# Patient Record
Sex: MALE | Race: OTHER | Employment: FULL TIME | URBAN - METROPOLITAN AREA
[De-identification: names, ages, dates, MRNs, and addresses within clinical notes are randomized per-mention and may not be internally consistent; named-entity substitution may affect disease eponyms.]

---

## 2018-01-28 ENCOUNTER — TELEPHONE (OUTPATIENT)
Dept: INTERNAL MEDICINE CLINIC | Facility: CLINIC | Age: 53
End: 2018-01-28

## 2018-01-28 NOTE — TELEPHONE ENCOUNTER
Call patient. Last OV in our practice was in 2015. Is he still our patient? Please verify YES or NO. If yes, then schedule male CPX. If no, then submit to [facility] for healthy planet removal.    Parviz Brown.  Kyle Madrid 74, [address] [facility] Board of Internal

## 2018-04-26 ENCOUNTER — HOSPITAL (OUTPATIENT)
Dept: OTHER | Age: 53
End: 2018-04-26
Attending: SPECIALIST

## 2018-06-01 ENCOUNTER — HOSPITAL (OUTPATIENT)
Dept: OTHER | Age: 53
End: 2018-06-01
Attending: SPECIALIST

## 2018-11-15 ENCOUNTER — HOSPITAL ENCOUNTER (OUTPATIENT)
Age: 53
Discharge: HOME | End: 2018-11-15
Attending: FAMILY MEDICINE
Payer: COMMERCIAL

## 2018-11-15 VITALS
TEMPERATURE: 98 F | DIASTOLIC BLOOD PRESSURE: 80 MMHG | SYSTOLIC BLOOD PRESSURE: 128 MMHG | HEART RATE: 76 BPM | OXYGEN SATURATION: 99 % | HEIGHT: 65 IN | WEIGHT: 215 LBS | RESPIRATION RATE: 18 BRPM | BODY MASS INDEX: 35.82 KG/M2

## 2018-11-15 DIAGNOSIS — K52.9 ACUTE GASTROENTERITIS: Primary | ICD-10-CM

## 2018-11-15 PROCEDURE — 85025 COMPLETE CBC W/AUTO DIFF WBC: CPT | Performed by: FAMILY MEDICINE

## 2018-11-15 PROCEDURE — 99214 OFFICE O/P EST MOD 30 MIN: CPT

## 2018-11-15 PROCEDURE — 99204 OFFICE O/P NEW MOD 45 MIN: CPT

## 2018-11-15 PROCEDURE — 96360 HYDRATION IV INFUSION INIT: CPT

## 2018-11-15 PROCEDURE — 80047 BASIC METABLC PNL IONIZED CA: CPT

## 2018-11-15 PROCEDURE — 81002 URINALYSIS NONAUTO W/O SCOPE: CPT | Performed by: FAMILY MEDICINE

## 2018-11-15 RX ORDER — ONDANSETRON 4 MG/1
4 TABLET, ORALLY DISINTEGRATING ORAL EVERY 4 HOURS PRN
Qty: 10 TABLET | Refills: 0 | Status: SHIPPED | OUTPATIENT
Start: 2018-11-15 | End: 2018-11-22

## 2018-11-15 RX ORDER — SODIUM CHLORIDE 9 MG/ML
1000 INJECTION, SOLUTION INTRAVENOUS ONCE
Status: COMPLETED | OUTPATIENT
Start: 2018-11-15 | End: 2018-11-15

## 2018-11-15 RX ORDER — LOPERAMIDE HYDROCHLORIDE 2 MG/1
2 TABLET ORAL AS NEEDED
Qty: 20 TABLET | Refills: 0 | Status: SHIPPED | OUTPATIENT
Start: 2018-11-15 | End: 2018-11-15

## 2018-11-15 RX ORDER — LOPERAMIDE HYDROCHLORIDE 2 MG/1
2 TABLET ORAL AS NEEDED
Qty: 20 TABLET | Refills: 0 | Status: SHIPPED | OUTPATIENT
Start: 2018-11-15 | End: 2018-12-15

## 2018-11-15 RX ORDER — ONDANSETRON 4 MG/1
4 TABLET, ORALLY DISINTEGRATING ORAL ONCE
Status: COMPLETED | OUTPATIENT
Start: 2018-11-15 | End: 2018-11-15

## 2018-11-15 RX ORDER — ONDANSETRON 4 MG/1
4 TABLET, ORALLY DISINTEGRATING ORAL EVERY 4 HOURS PRN
Qty: 10 TABLET | Refills: 0 | Status: SHIPPED | OUTPATIENT
Start: 2018-11-15 | End: 2018-11-15

## 2018-11-16 NOTE — ED NOTES
Message left to [address] [place] [address]  to cancel the prescriptions Loperamide and Zofran. Pharmacy changed to [facility] [address] in [place].

## 2018-11-16 NOTE — ED PROVIDER NOTES
Patient Seen in: [facility]    History   Patient presents with:  Nausea/Vomiting/Diarrhea (gastrointestinal)  Abdominal Pain    Stated Complaint: ABD PAIN     HPI    62-year-old male with no stated past medical history has had 2 97.5 kg   SpO2 95%   BMI 35.78 kg/m²         Physical Exam    Gen: Pleasant male in mild distress. HEENT: NCAT. EOMI, PERRLA, Non-icteric. Dry mucous membranes. Neck: Supple.  FROM  Cor: S1S2, no murmur, RRR  Lungs: CTA B, no w/r/r  Abd: Decreased kyrie [address]  [phone]    Call   As needed        Medications Prescribed:  Current Discharge Medication List    START taking these medications    ondansetron 4 MG Oral Tablet Dispersible  Take 1 tablet (4 mg total) by mouth every 4 (four) hours as needed for

## 2018-11-16 NOTE — ED INITIAL ASSESSMENT (HPI)
Patient presents with cc of nausea,vomiting(emesis x 2 )diarrhea(x2 yesterday and today) x 2 days. +Chills. Wife was sick w/similar symptoms(recent trip to [facility])

## 2020-01-27 ENCOUNTER — WALK IN (OUTPATIENT)
Dept: URGENT CARE | Age: 55
End: 2020-01-27

## 2020-01-27 DIAGNOSIS — R68.89 FLU-LIKE SYMPTOMS: Primary | ICD-10-CM

## 2020-01-27 DIAGNOSIS — H66.002 NON-RECURRENT ACUTE SUPPURATIVE OTITIS MEDIA OF LEFT EAR WITHOUT SPONTANEOUS RUPTURE OF TYMPANIC MEMBRANE: ICD-10-CM

## 2020-01-27 DIAGNOSIS — H65.01 NON-RECURRENT ACUTE SEROUS OTITIS MEDIA OF RIGHT EAR: ICD-10-CM

## 2020-01-27 PROCEDURE — 99203 OFFICE O/P NEW LOW 30 MIN: CPT | Performed by: NURSE PRACTITIONER

## 2020-01-27 RX ORDER — FEXOFENADINE HCL 180 MG/1
TABLET ORAL
COMMUNITY

## 2020-01-27 RX ORDER — OMEPRAZOLE 40 MG/1
40 CAPSULE, DELAYED RELEASE ORAL
COMMUNITY
Start: 2018-05-01

## 2020-01-27 RX ORDER — AMOXICILLIN 875 MG/1
875 TABLET, COATED ORAL 2 TIMES DAILY
Qty: 20 TABLET | Refills: 0 | Status: SHIPPED | OUTPATIENT
Start: 2020-01-27 | End: 2020-02-06

## 2020-01-27 SDOH — HEALTH STABILITY: MENTAL HEALTH: HOW OFTEN DO YOU HAVE A DRINK CONTAINING ALCOHOL?: NEVER

## 2020-01-27 ASSESSMENT — PAIN SCALES - GENERAL: PAINLEVEL: 5-6

## 2020-01-27 ASSESSMENT — ENCOUNTER SYMPTOMS
CHILLS: 1
HEADACHES: 1
SHORTNESS OF BREATH: 0
COUGH: 1
TINGLING: 0
SORE THROAT: 1
SPUTUM PRODUCTION: 0
DIZZINESS: 0
GASTROINTESTINAL NEGATIVE: 1
WHEEZING: 0
FEVER: 1
HEMOPTYSIS: 0

## 2020-12-29 ENCOUNTER — HOSPITAL ENCOUNTER (OUTPATIENT)
Facility: HOSPITAL | Age: 55
Setting detail: OBSERVATION
Discharge: HOME | End: 2020-12-30
Attending: EMERGENCY MEDICINE | Admitting: HOSPITALIST
Payer: COMMERCIAL

## 2020-12-29 ENCOUNTER — HOSPITAL ENCOUNTER (OUTPATIENT)
Age: 55
Discharge: ANOTHER HOSPITAL | End: 2020-12-29
Attending: EMERGENCY MEDICINE
Payer: COMMERCIAL

## 2020-12-29 ENCOUNTER — APPOINTMENT (OUTPATIENT)
Dept: CT IMAGING | Age: 55
End: 2020-12-29
Attending: EMERGENCY MEDICINE
Payer: COMMERCIAL

## 2020-12-29 VITALS
DIASTOLIC BLOOD PRESSURE: 79 MMHG | RESPIRATION RATE: 16 BRPM | TEMPERATURE: 97 F | SYSTOLIC BLOOD PRESSURE: 130 MMHG | OXYGEN SATURATION: 98 % | HEART RATE: 61 BPM

## 2020-12-29 DIAGNOSIS — K22.89 ESOPHAGEAL MASS: Primary | ICD-10-CM

## 2020-12-29 LAB — SARS-COV-2 RNA RESP QL NAA+PROBE: NOT DETECTED

## 2020-12-29 PROCEDURE — 96361 HYDRATE IV INFUSION ADD-ON: CPT

## 2020-12-29 PROCEDURE — 74176 CT ABD & PELVIS W/O CONTRAST: CPT | Performed by: EMERGENCY MEDICINE

## 2020-12-29 PROCEDURE — 96375 TX/PRO/DX INJ NEW DRUG ADDON: CPT

## 2020-12-29 PROCEDURE — 96374 THER/PROPH/DIAG INJ IV PUSH: CPT

## 2020-12-29 PROCEDURE — 99220 INITIAL OBSERVATION CARE,LEVL III: CPT | Performed by: HOSPITALIST

## 2020-12-29 PROCEDURE — 85025 COMPLETE CBC W/AUTO DIFF WBC: CPT | Performed by: EMERGENCY MEDICINE

## 2020-12-29 PROCEDURE — 81002 URINALYSIS NONAUTO W/O SCOPE: CPT | Performed by: EMERGENCY MEDICINE

## 2020-12-29 PROCEDURE — 80047 BASIC METABLC PNL IONIZED CA: CPT

## 2020-12-29 PROCEDURE — 99215 OFFICE O/P EST HI 40 MIN: CPT

## 2020-12-29 RX ORDER — ENOXAPARIN SODIUM 100 MG/ML
40 INJECTION SUBCUTANEOUS DAILY
Status: DISCONTINUED | OUTPATIENT
Start: 2020-12-29 | End: 2020-12-30

## 2020-12-29 RX ORDER — KETOROLAC TROMETHAMINE 30 MG/ML
15 INJECTION, SOLUTION INTRAMUSCULAR; INTRAVENOUS ONCE
Status: COMPLETED | OUTPATIENT
Start: 2020-12-29 | End: 2020-12-29

## 2020-12-29 RX ORDER — ONDANSETRON 2 MG/ML
4 INJECTION INTRAMUSCULAR; INTRAVENOUS ONCE
Status: COMPLETED | OUTPATIENT
Start: 2020-12-29 | End: 2020-12-29

## 2020-12-29 RX ORDER — SODIUM CHLORIDE 9 MG/ML
1000 INJECTION, SOLUTION INTRAVENOUS ONCE
Status: COMPLETED | OUTPATIENT
Start: 2020-12-29 | End: 2020-12-29

## 2020-12-29 RX ORDER — OMEPRAZOLE 40 MG/1
40 CAPSULE, DELAYED RELEASE ORAL DAILY
COMMUNITY

## 2020-12-29 RX ORDER — SODIUM CHLORIDE 9 MG/ML
INJECTION, SOLUTION INTRAVENOUS CONTINUOUS
Status: ACTIVE | OUTPATIENT
Start: 2020-12-29 | End: 2020-12-29

## 2020-12-29 RX ORDER — PROCHLORPERAZINE EDISYLATE 5 MG/ML
5 INJECTION INTRAMUSCULAR; INTRAVENOUS EVERY 8 HOURS PRN
Status: DISCONTINUED | OUTPATIENT
Start: 2020-12-29 | End: 2020-12-30

## 2020-12-29 RX ORDER — ACETAMINOPHEN 325 MG/1
650 TABLET ORAL EVERY 6 HOURS PRN
Status: DISCONTINUED | OUTPATIENT
Start: 2020-12-29 | End: 2020-12-30

## 2020-12-29 RX ORDER — CETIRIZINE HYDROCHLORIDE 10 MG/1
10 TABLET ORAL DAILY
Status: DISCONTINUED | OUTPATIENT
Start: 2020-12-30 | End: 2020-12-30

## 2020-12-29 RX ORDER — ONDANSETRON 2 MG/ML
4 INJECTION INTRAMUSCULAR; INTRAVENOUS EVERY 6 HOURS PRN
Status: DISCONTINUED | OUTPATIENT
Start: 2020-12-29 | End: 2020-12-30

## 2020-12-29 RX ORDER — OMEPRAZOLE 20 MG/1
40 CAPSULE, DELAYED RELEASE ORAL
COMMUNITY
End: 2020-12-29

## 2020-12-29 RX ORDER — LORATADINE 10 MG/1
10 TABLET ORAL DAILY
COMMUNITY

## 2020-12-29 RX ORDER — SODIUM CHLORIDE, SODIUM LACTATE, POTASSIUM CHLORIDE, CALCIUM CHLORIDE 600; 310; 30; 20 MG/100ML; MG/100ML; MG/100ML; MG/100ML
INJECTION, SOLUTION INTRAVENOUS CONTINUOUS
Status: DISCONTINUED | OUTPATIENT
Start: 2020-12-29 | End: 2020-12-30

## 2020-12-29 NOTE — ED INITIAL ASSESSMENT (HPI)
Patient presents to IC with cc of right lower abdominal pain which radiates periumbilically. +Nausea yesterday without emesis. No diarrhea. Had eaten 3 day old shrimp Sunday night. No fever noted. Pain is with palpation.

## 2020-12-29 NOTE — ED PROVIDER NOTES
Patient Seen in: [facility] Emergency Department      History   Patient presents with:   Other    Stated Complaint: esophageal mass    HPI    65-year-old male sent from immediate care after CT scan showed evidence of an esophageal mass concerning for Vitals as listed. No acute distress   HEENT: Sclerae anicteric. Conjunctivae show no pallor.   Oropharynx clear, mucous membranes moist   Neck: supple, no rigidity   Lungs: good air exchange and clear   Heart: regular rate rhythm and no murmur   Abdomen: prominent in size measuring up to 8 mm at the base. There is no inflammatory change surrounding the appendix which makes appendicitis much less likely.   There is a moderate amount of stool throughout the colon including the cecum which  may explain the [facility] Prescribed:  Current Discharge Medication List                          Present on Admission  Date Reviewed: 12/29/2020          ICD-10-CM Noted POA    Esophageal mass K22.8 12/29/2020 Unknown

## 2020-12-29 NOTE — ED PROVIDER NOTES
Patient Seen in: [facility]      History   Patient presents with:  Abdominal Pain    Stated Complaint: had shrimp,stomache pain    HPI    50-year-old gentleman coming for evaluation for some right-sided abdominal pain started Sunday after Pulmonary:      Effort: Pulmonary effort is normal. No respiratory distress. Abdominal:      General: There is no distension. Palpations: Abdomen is soft. Tenderness: There is abdominal tenderness in the right lower quadrant.  There is no guar an exophytic masslike lesion to the left of     midline involving the distal esophagus measuring 48 x 35 mm.   This does     appear to be a submucosal mass like a leiomyoma however other neoplasm     such as just tumor or adenocarcinoma of     the esophagus IV established. Laboratory work-up was reviewed. Patient had a CT scan of the abdomen pelvis demonstrating a prominent size appendix but no periappendiceal change.  I discussed case and CT with Dr. Lawyer Ellis who does not feel patient stands consistent wi

## 2020-12-29 NOTE — ED NOTES
Pt given menu now. Per ED MD, ok to eat. NPO after midnight.  Pt states understanding of NPO after midnight

## 2020-12-29 NOTE — ED INITIAL ASSESSMENT (HPI)
Pt states eating shrimp on Sunday evening. Pt noted abd pain since. Right upper quad. Pt denies fevers. Pt denies vomiting and diarrhea. Pt had CT scan at immediate care. Esophageal mass was found. Pt was sent here for admission and covid screening.

## 2020-12-30 ENCOUNTER — ANESTHESIA EVENT (OUTPATIENT)
Dept: ENDOSCOPY | Facility: HOSPITAL | Age: 55
End: 2020-12-30
Payer: COMMERCIAL

## 2020-12-30 ENCOUNTER — ANESTHESIA (OUTPATIENT)
Dept: ENDOSCOPY | Facility: HOSPITAL | Age: 55
End: 2020-12-30
Payer: COMMERCIAL

## 2020-12-30 VITALS
HEIGHT: 66 IN | RESPIRATION RATE: 18 BRPM | WEIGHT: 224.63 LBS | OXYGEN SATURATION: 97 % | SYSTOLIC BLOOD PRESSURE: 104 MMHG | DIASTOLIC BLOOD PRESSURE: 75 MMHG | BODY MASS INDEX: 36.1 KG/M2 | TEMPERATURE: 98 F | HEART RATE: 65 BPM

## 2020-12-30 LAB
ANION GAP SERPL CALC-SCNC: 0 MMOL/L (ref 0–18)
BASOPHILS # BLD AUTO: 0.04 X10(3) UL (ref 0–0.2)
BASOPHILS NFR BLD AUTO: 0.6 %
BUN BLD-MCNC: 14 MG/DL (ref 7–18)
BUN/CREAT SERPL: 12.3 (ref 10–20)
CALCIUM BLD-MCNC: 8.7 MG/DL (ref 8.5–10.1)
CHLORIDE SERPL-SCNC: 111 MMOL/L (ref 98–112)
CO2 SERPL-SCNC: 29 MMOL/L (ref 21–32)
CREAT BLD-MCNC: 1.14 MG/DL
DEPRECATED RDW RBC AUTO: 44.2 FL (ref 35.1–46.3)
EOSINOPHIL # BLD AUTO: 0.18 X10(3) UL (ref 0–0.7)
EOSINOPHIL NFR BLD AUTO: 2.6 %
ERYTHROCYTE [DISTWIDTH] IN BLOOD BY AUTOMATED COUNT: 13.9 % (ref 11–15)
GLUCOSE BLD-MCNC: 95 MG/DL (ref 70–99)
HCT VFR BLD AUTO: 43.7 %
HGB BLD-MCNC: 14.8 G/DL
IMM GRANULOCYTES # BLD AUTO: 0.04 X10(3) UL (ref 0–1)
IMM GRANULOCYTES NFR BLD: 0.6 %
LYMPHOCYTES # BLD AUTO: 2.08 X10(3) UL (ref 1–4)
LYMPHOCYTES NFR BLD AUTO: 30.5 %
MCH RBC QN AUTO: 29.3 PG (ref 26–34)
MCHC RBC AUTO-ENTMCNC: 33.9 G/DL (ref 31–37)
MCV RBC AUTO: 86.5 FL
MONOCYTES # BLD AUTO: 0.5 X10(3) UL (ref 0.1–1)
MONOCYTES NFR BLD AUTO: 7.3 %
NEUTROPHILS # BLD AUTO: 3.97 X10 (3) UL (ref 1.5–7.7)
NEUTROPHILS # BLD AUTO: 3.97 X10(3) UL (ref 1.5–7.7)
NEUTROPHILS NFR BLD AUTO: 58.4 %
OSMOLALITY SERPL CALC.SUM OF ELEC: 290 MOSM/KG (ref 275–295)
PLATELET # BLD AUTO: 213 10(3)UL (ref 150–450)
POTASSIUM SERPL-SCNC: 4.2 MMOL/L (ref 3.5–5.1)
RBC # BLD AUTO: 5.05 X10(6)UL
SODIUM SERPL-SCNC: 140 MMOL/L (ref 136–145)
WBC # BLD AUTO: 6.8 X10(3) UL (ref 4–11)

## 2020-12-30 PROCEDURE — 0DB68ZX EXCISION OF STOMACH, VIA NATURAL OR ARTIFICIAL OPENING ENDOSCOPIC, DIAGNOSTIC: ICD-10-PCS | Performed by: STUDENT IN AN ORGANIZED HEALTH CARE EDUCATION/TRAINING PROGRAM

## 2020-12-30 PROCEDURE — 99243 OFF/OP CNSLTJ NEW/EST LOW 30: CPT | Performed by: COLON & RECTAL SURGERY

## 2020-12-30 PROCEDURE — 99217 OBSERVATION CARE DISCHARGE: CPT | Performed by: HOSPITALIST

## 2020-12-30 RX ORDER — NALOXONE HYDROCHLORIDE 0.4 MG/ML
80 INJECTION, SOLUTION INTRAMUSCULAR; INTRAVENOUS; SUBCUTANEOUS AS NEEDED
Status: DISCONTINUED | OUTPATIENT
Start: 2020-12-30 | End: 2020-12-30 | Stop reason: HOSPADM

## 2020-12-30 RX ORDER — LIDOCAINE HYDROCHLORIDE 10 MG/ML
INJECTION, SOLUTION EPIDURAL; INFILTRATION; INTRACAUDAL; PERINEURAL AS NEEDED
Status: DISCONTINUED | OUTPATIENT
Start: 2020-12-30 | End: 2020-12-30 | Stop reason: SURG

## 2020-12-30 RX ORDER — ONDANSETRON 2 MG/ML
4 INJECTION INTRAMUSCULAR; INTRAVENOUS AS NEEDED
Status: DISCONTINUED | OUTPATIENT
Start: 2020-12-30 | End: 2020-12-30 | Stop reason: HOSPADM

## 2020-12-30 RX ORDER — SODIUM CHLORIDE, SODIUM LACTATE, POTASSIUM CHLORIDE, CALCIUM CHLORIDE 600; 310; 30; 20 MG/100ML; MG/100ML; MG/100ML; MG/100ML
INJECTION, SOLUTION INTRAVENOUS CONTINUOUS
Status: DISCONTINUED | OUTPATIENT
Start: 2020-12-30 | End: 2020-12-30

## 2020-12-30 RX ADMIN — LIDOCAINE HYDROCHLORIDE 30 MG: 10 INJECTION, SOLUTION EPIDURAL; INFILTRATION; INTRACAUDAL; PERINEURAL at 10:42:00

## 2020-12-30 RX ADMIN — SODIUM CHLORIDE, SODIUM LACTATE, POTASSIUM CHLORIDE, CALCIUM CHLORIDE: 600; 310; 30; 20 INJECTION, SOLUTION INTRAVENOUS at 10:37:00

## 2020-12-30 RX ADMIN — SODIUM CHLORIDE, SODIUM LACTATE, POTASSIUM CHLORIDE, CALCIUM CHLORIDE: 600; 310; 30; 20 INJECTION, SOLUTION INTRAVENOUS at 10:56:00

## 2020-12-30 NOTE — OPERATIVE REPORT
EGD Operative Report  Patient Name: Amparo Vann  YOB: 1965  MRN: [id number]  Procedure: Esophagogastroduodenoscopy (EGD)    Pre-operative Diagnosis & Indication: CT abnormal, esophageal mass  Post-operative Diagnosis: 5cm hiatal hernia  Atte Olympus video upper endoscope was inserted through the oropharynx and gently manipulated through the esophagus into the stomach and the second portion of the duodenum. Upon withdrawal of the endoscope, careful visualization of the mucosa was performed.  The resume general diet for now  GI will signoff. Please page with any questions. Post procedure I discussed above and findings, management with patient and his wife. All questions answered in full.      Diane Cruz  12/30/2020  11:09 AM

## 2020-12-30 NOTE — PLAN OF CARE
NURSING ADMISSION NOTE      Patient admitted via cart  Oriented to room. Safety precautions initiated. Bed in low position. Call light in reach. In no distress on admission. Sips of h2o with meds, and ice chips. Iv fluids started.  [facility]  No

## 2020-12-30 NOTE — H&P
[facility]  History and Physical     Annie Chung Patient Status:  Emergency    3/9/1965 MRN [id number]   Location [address] [facility] Attending Emma Elizalde MD   Hosp Day # 0 PCP Jewels Romano MD     Chief Complaint: Anicteric. Neck: No lymphadenopathy. No JVD. No carotid bruits. Respiratory: Clear to auscultation bilaterally. No wheezes. No rhonchi. Cardiovascular: S1, S2. Regular rate and rhythm. No murmurs, rubs or gallops. Equal pulses.    Chest and Back: No tend

## 2020-12-30 NOTE — CONSULTS
[address] [facility] Gastroenterology     Uri Martinez Patient Status:  Inpatient    3/9/1965 MRN [id number]   [facility] 4NW-A Attending Joshua Desai MD   Hosp Day # 1 PCP Scott Nelson MD unless otherwise mentioned in hpi. Physical Exam  /76   Pulse 74   Temp 97.8 °F (36.6 °C)   Resp 18   Ht 5' 6\" (1.676 m)   Wt 224 lb 9.6 oz (101.9 kg)   SpO2 90%   BMI 36.25 kg/m²   Body mass index is 36.25 kg/m².     Gen: Awake and alert, NAD  E of midline involving the distal esophagus measuring 48 x 35 mm.   This does appear to be a submucosal mass like a leiomyoma however other neoplasm such as just tumor or adenocarcinoma of   the esophagus cannot be completely excluded and further evaluation r alternatives and their benefits and risks were discussed with the patient or the patient's surrogate.  The discussion of risks, not limited to but including bleeding, infection, perforation, adverse effects from anesthesia, and possible prolonged hospitaliz

## 2020-12-30 NOTE — CONSULTS
[facility]  Report of Consultation    Gabriella Kumar Patient Status:  Inpatient    3/9/1965 MRN [id number]   [facility] 4NW-A Attending Romy Chavira MD   Hosp Day # 1 PCP Dot Muhammad MD     Reason for Consultation:  Abnormal C tab 10 mg, 10 mg, Oral, Daily  •  lactated ringers infusion, , Intravenous, Continuous  •  Enoxaparin Sodium (LOVENOX) 40 MG/0.4ML injection 40 mg, 40 mg, Subcutaneous, Daily  •  acetaminophen (TYLENOL) tab 650 mg, 650 mg, Oral, Q6H PRN  •  ondansetron HCl auscultation bilaterally. No wheezes, no rales, no rhonchi. Good excursion of the diaphragms. No secondary use of accessory respiratory musculature. Cardiac: Regular rate and rhythm. No murmur.     Abdomen: Soft, nontender throughout specifically over [facility] or enlargement. AORTA/VASCULAR:    Unremarkable as seen on non-contrast imaging. RETROPERITONEUM:  No mass or adenopathy.     BOWEL/MESENTERY:  There is an exophytic masslike lesion to the left of midline involving the distal esophagus measuring 48 x 3 1:27 PM            Impression:  Patient Active Problem List:     Esophageal mass     Gastroesophageal reflux disease     Vertigo      58-year-old male who presents with epigastric abdominal pain and upset stomach.   CT scan of the abdomen demonstrated rita was no inflammation or fluid around the appendix.     Patient's nausea persisted and he was admitted to [facility].    Patient did undergo EGD earlier today by gastroenterology showing an approximately 5 cm hiatal hernia without evidence of luminal mas

## 2020-12-30 NOTE — RESPIRATORY THERAPY NOTE
RAYMOND - Equipment Use Daily Summary:                  . Set Mode: AUTO CPAP WITH C-FLEX                . Usage in hours: 6:48                . 90% Pressure (EPAP) level: 12                . 90% Insp. Pressure (IPAP): Linda Skinner AHI: 1.2                .

## 2020-12-30 NOTE — RESPIRATORY THERAPY NOTE
Visited Pt for RAYMOND. Pt wears a CPAP machine at home, RT to place for tonight. Will continue to monitor.

## 2020-12-30 NOTE — ANESTHESIA PREPROCEDURE EVALUATION
PRE-OP EVALUATION    Patient Name: Helio Clancy    Pre-op Diagnosis: Esophageal mass    Procedure(s):      Surgeon(s) and Role:     Julia Zhou MD - Primary    Pre-op vitals reviewed.   Temp: 97.8 °F (36.6 °C)  Pulse: 74  Resp: 18  BP: 117/76  SpO2: 90 Esophageal mass     Gastroesophageal reflux disease     Vertigo          Past Surgical History:   Procedure Laterality Date   • OTHER SURGICAL HISTORY      hand surg     Social History    Tobacco Use      Smoking status: Former Smoker        Packs/day: 0.2

## 2020-12-30 NOTE — PROGRESS NOTES
[facility]  Progress Note     Fredick End Patient Status:  Inpatient    3/9/1965 MRN [id number]   [facility] 4NW-A Attending Bing Callahan MD   Hosp Day # 1 PCP Geraldine Alarcon MD     Chief Complaint: Abdominal pain    S: [facility] Live pantoprazole (PROTONIX) IV push  40 mg Intravenous Q12H       ASSESSMENT / PLAN:     1. Esophageal abnormality on CT c/w hernia  1. EGD today: 5 cm hiatal hernia  2. Needs outpt EUS +/- FNA  3. GI following  2. Abnormal appendix, surgery eval  3.  GERD, as

## 2020-12-30 NOTE — PLAN OF CARE
NURSING DISCHARGE NOTE    Discharged Home via Wheelchair. Accompanied by Family member  Belongings Taken by patient/family. Pt AO x4, VSS, no complaints of pain, no N/V/D, no shortness of breath.  Pt cleared for d/c by GI, surgery and hospitalist af

## 2021-01-05 NOTE — PROGRESS NOTES
Lazaro Lester recently had an upper endoscopy (EGD) procedure completed with biopsies of the stomach. The biopsies of the stomach showed no signs of infection or inflammation. Good news.      Please call the office if you have any questions or concerns

## 2021-01-21 PROBLEM — E78.5 HYPERLIPIDEMIA: Status: ACTIVE | Noted: 2021-01-21

## 2021-01-21 PROBLEM — R10.13 DYSPEPSIA: Status: ACTIVE | Noted: 2021-01-21

## 2021-01-21 PROBLEM — B35.1 ONYCHOMYCOSIS: Status: ACTIVE | Noted: 2021-01-21

## 2021-01-21 PROBLEM — J03.90 ACUTE TONSILLITIS: Status: ACTIVE | Noted: 2021-01-21

## 2021-01-21 PROBLEM — J20.9 ACUTE BRONCHITIS: Status: ACTIVE | Noted: 2021-01-21

## 2021-01-21 PROBLEM — E66.9 OBESE: Status: ACTIVE | Noted: 2021-01-21

## 2021-02-01 ENCOUNTER — LAB ENCOUNTER (OUTPATIENT)
Dept: LAB | Age: 56
End: 2021-02-01
Attending: INTERNAL MEDICINE
Payer: COMMERCIAL

## 2021-02-01 DIAGNOSIS — R93.3 ABNORMAL FINDING ON GI TRACT IMAGING: ICD-10-CM

## 2021-02-02 ENCOUNTER — ANESTHESIA EVENT (OUTPATIENT)
Dept: ENDOSCOPY | Facility: HOSPITAL | Age: 56
End: 2021-02-02
Payer: COMMERCIAL

## 2021-02-02 LAB — SARS-COV-2 RNA RESP QL NAA+PROBE: NOT DETECTED

## 2021-02-03 ENCOUNTER — ANESTHESIA (OUTPATIENT)
Dept: ENDOSCOPY | Facility: HOSPITAL | Age: 56
End: 2021-02-03
Payer: COMMERCIAL

## 2021-02-03 ENCOUNTER — HOSPITAL ENCOUNTER (OUTPATIENT)
Facility: HOSPITAL | Age: 56
Setting detail: HOSPITAL OUTPATIENT SURGERY
Discharge: HOME | End: 2021-02-03
Attending: INTERNAL MEDICINE | Admitting: INTERNAL MEDICINE
Payer: COMMERCIAL

## 2021-02-03 VITALS
TEMPERATURE: 98 F | HEIGHT: 66 IN | SYSTOLIC BLOOD PRESSURE: 104 MMHG | BODY MASS INDEX: 38.09 KG/M2 | DIASTOLIC BLOOD PRESSURE: 68 MMHG | RESPIRATION RATE: 16 BRPM | HEART RATE: 57 BPM | OXYGEN SATURATION: 98 % | WEIGHT: 237 LBS

## 2021-02-03 DIAGNOSIS — R93.3 ABNORMAL FINDING ON GI TRACT IMAGING: Primary | ICD-10-CM

## 2021-02-03 PROCEDURE — BD47ZZZ ULTRASONOGRAPHY OF GASTROINTESTINAL TRACT: ICD-10-PCS | Performed by: INTERNAL MEDICINE

## 2021-02-03 PROCEDURE — 0DJ08ZZ INSPECTION OF UPPER INTESTINAL TRACT, VIA NATURAL OR ARTIFICIAL OPENING ENDOSCOPIC: ICD-10-PCS | Performed by: INTERNAL MEDICINE

## 2021-02-03 RX ORDER — SODIUM CHLORIDE, SODIUM LACTATE, POTASSIUM CHLORIDE, CALCIUM CHLORIDE 600; 310; 30; 20 MG/100ML; MG/100ML; MG/100ML; MG/100ML
INJECTION, SOLUTION INTRAVENOUS CONTINUOUS
Status: DISCONTINUED | OUTPATIENT
Start: 2021-02-03 | End: 2021-02-03

## 2021-02-03 RX ORDER — NALOXONE HYDROCHLORIDE 0.4 MG/ML
80 INJECTION, SOLUTION INTRAMUSCULAR; INTRAVENOUS; SUBCUTANEOUS AS NEEDED
Status: DISCONTINUED | OUTPATIENT
Start: 2021-02-03 | End: 2021-02-03

## 2021-02-03 RX ORDER — LIDOCAINE HYDROCHLORIDE 10 MG/ML
INJECTION, SOLUTION EPIDURAL; INFILTRATION; INTRACAUDAL; PERINEURAL AS NEEDED
Status: DISCONTINUED | OUTPATIENT
Start: 2021-02-03 | End: 2021-02-03 | Stop reason: SURG

## 2021-02-03 RX ADMIN — LIDOCAINE HYDROCHLORIDE 25 MG: 10 INJECTION, SOLUTION EPIDURAL; INFILTRATION; INTRACAUDAL; PERINEURAL at 07:23:00

## 2021-02-03 RX ADMIN — SODIUM CHLORIDE, SODIUM LACTATE, POTASSIUM CHLORIDE, CALCIUM CHLORIDE: 600; 310; 30; 20 INJECTION, SOLUTION INTRAVENOUS at 07:20:00

## 2021-02-03 RX ADMIN — SODIUM CHLORIDE, SODIUM LACTATE, POTASSIUM CHLORIDE, CALCIUM CHLORIDE: 600; 310; 30; 20 INJECTION, SOLUTION INTRAVENOUS at 07:42:00

## 2021-02-03 NOTE — H&P
History & Physical Examination    Patient Name: Branden Casanova  MRN: [id number]  CSN: [id number]  YOB: 1965    Diagnosis: abnormal imaging of the esophagus    Present Illness: 64 y/o M history as above presents for EGD/EUS.        •  loratadine 1

## 2021-02-03 NOTE — OPERATIVE REPORT
Monique Duncan Patient Status:  Hospital Outpatient Surgery    3/9/1965 MRN [id number]   [facility] ENDOSCOPY Attending Mary Wise,    Hosp Day # 0 PCP Lula Weiner MD     PREOPERATIVE DIAGNOSIS/INDICATION: Abnormal was 40 cm from the incisors and regular. Stomach: Large paraesophageal hernia. Mild gastritis.      Duodenum: The duodenal bulb was normal. The examined descending duodenum was normal.     ENDOSONOGRAPHIC FINDINGS:    The exam began with a radial echoen

## 2021-02-03 NOTE — ANESTHESIA POSTPROCEDURE EVALUATION
[address] [facility] Patient Status:  Hospital Outpatient Surgery   Age/Gender 63year old male MRN [id number]   Location [address] [place] [address]. Attending Cody Haile DO   Hosp Day # 0 PCP Lewis Strong MD       Anesthesia Post-op

## 2021-02-03 NOTE — ANESTHESIA PREPROCEDURE EVALUATION
PRE-OP EVALUATION    Patient Name: Ray Dupree    Pre-op Diagnosis: Abnormal finding on GI tract imaging [R93.3]    Procedure(s):  ESOPHAGOGASTRODUODENOSCOPY (EGD)/Endoscopic Ultrasound      Surgeon(s) and Role:     * Shanita Render, DO - Primary    Pr 29.0 12/30/2020    BUN 14 12/30/2020    CREATSERUM 1.14 12/30/2020    GLU 95 12/30/2020    CA 8.7 12/30/2020            Airway      Mallampati: III  Mouth opening: >3 FB  TM distance: > 6 cm  Neck ROM: full Cardiovascular    Cardiovascular exam normal.  Rh

## 2021-05-26 VITALS
WEIGHT: 225 LBS | SYSTOLIC BLOOD PRESSURE: 132 MMHG | DIASTOLIC BLOOD PRESSURE: 76 MMHG | HEIGHT: 66 IN | OXYGEN SATURATION: 97 % | HEART RATE: 77 BPM | TEMPERATURE: 98.6 F | BODY MASS INDEX: 36.16 KG/M2

## 2022-05-24 NOTE — ANESTHESIA POSTPROCEDURE EVALUATION
[address] [facility] Patient Status:  Inpatient   Age/Gender 63year old male MRN [id number]   Location [address] [facility]. Attending Mina Clark MD   Hosp Day # 1 PCP Gt Pascual MD       Anesthesia Post-op Note    Procedure(s) 24-May-2022 20:00

## 2022-10-12 ENCOUNTER — HOSPITAL ENCOUNTER (OUTPATIENT)
Age: 57
Discharge: HOME | End: 2022-10-12
Attending: EMERGENCY MEDICINE
Payer: COMMERCIAL

## 2022-10-12 VITALS
TEMPERATURE: 98 F | SYSTOLIC BLOOD PRESSURE: 119 MMHG | DIASTOLIC BLOOD PRESSURE: 74 MMHG | RESPIRATION RATE: 18 BRPM | HEIGHT: 64 IN | BODY MASS INDEX: 37.56 KG/M2 | HEART RATE: 94 BPM | OXYGEN SATURATION: 96 % | WEIGHT: 220 LBS

## 2022-10-12 DIAGNOSIS — K92.2 GASTROINTESTINAL HEMORRHAGE, UNSPECIFIED GASTROINTESTINAL HEMORRHAGE TYPE: Primary | ICD-10-CM

## 2022-10-12 DIAGNOSIS — R11.2 NAUSEA VOMITING AND DIARRHEA: ICD-10-CM

## 2022-10-12 DIAGNOSIS — R19.7 NAUSEA VOMITING AND DIARRHEA: ICD-10-CM

## 2022-10-12 LAB
#MXD IC: 0.9 X10ˆ3/UL (ref 0.1–1)
BUN BLD-MCNC: 28 MG/DL (ref 7–18)
C DIFF TOX B STL QL: NEGATIVE
CHLORIDE BLD-SCNC: 99 MMOL/L (ref 98–112)
CO2 BLD-SCNC: 26 MMOL/L (ref 21–32)
CREAT BLD-MCNC: 1 MG/DL
GFR SERPLBLD BASED ON 1.73 SQ M-ARVRAT: 87 ML/MIN/1.73M2 (ref 60–?)
GLUCOSE BLD-MCNC: 138 MG/DL (ref 70–99)
HCT VFR BLD AUTO: 45.5 %
HCT VFR BLD CALC: 45 %
HGB BLD-MCNC: 14.8 G/DL
ISTAT IONIZED CALCIUM FOR CHEM 8: 1.07 MMOL/L (ref 1.12–1.32)
LYMPHOCYTES # BLD AUTO: 1.3 X10ˆ3/UL (ref 1–4)
LYMPHOCYTES NFR BLD AUTO: 15.7 %
MCH RBC QN AUTO: 29.1 PG (ref 26–34)
MCHC RBC AUTO-ENTMCNC: 32.5 G/DL (ref 31–37)
MCV RBC AUTO: 89.6 FL (ref 80–100)
MIXED CELL %: 10.9 %
NEUTROPHILS # BLD AUTO: 6.2 X10ˆ3/UL (ref 1.5–7.7)
NEUTROPHILS NFR BLD AUTO: 73.4 %
PLATELET # BLD AUTO: 225 X10ˆ3/UL (ref 150–450)
POTASSIUM BLD-SCNC: 4 MMOL/L (ref 3.6–5.1)
RBC # BLD AUTO: 5.08 X10ˆ6/UL
SODIUM BLD-SCNC: 137 MMOL/L (ref 136–145)
WBC # BLD AUTO: 8.4 X10ˆ3/UL (ref 4–11)

## 2022-10-12 PROCEDURE — 99215 OFFICE O/P EST HI 40 MIN: CPT

## 2022-10-12 PROCEDURE — 87493 C DIFF AMPLIFIED PROBE: CPT | Performed by: EMERGENCY MEDICINE

## 2022-10-12 PROCEDURE — 87507 IADNA-DNA/RNA PROBE TQ 12-25: CPT | Performed by: EMERGENCY MEDICINE

## 2022-10-12 PROCEDURE — 85025 COMPLETE CBC W/AUTO DIFF WBC: CPT | Performed by: EMERGENCY MEDICINE

## 2022-10-12 PROCEDURE — 36415 COLL VENOUS BLD VENIPUNCTURE: CPT

## 2022-10-12 PROCEDURE — 80047 BASIC METABLC PNL IONIZED CA: CPT

## 2022-10-12 PROCEDURE — 99214 OFFICE O/P EST MOD 30 MIN: CPT

## 2022-10-12 RX ORDER — OMEPRAZOLE 40 MG/1
40 CAPSULE, DELAYED RELEASE ORAL DAILY
Qty: 30 CAPSULE | Refills: 0 | Status: SHIPPED | OUTPATIENT
Start: 2022-10-12 | End: 2022-11-11

## 2022-10-12 RX ORDER — ONDANSETRON 4 MG/1
4 TABLET, ORALLY DISINTEGRATING ORAL EVERY 4 HOURS PRN
Qty: 10 TABLET | Refills: 0 | Status: SHIPPED | OUTPATIENT
Start: 2022-10-12 | End: 2022-10-19

## 2022-10-13 LAB
ADENOVIRUS F 40/41 PCR: NEGATIVE
ASTROVIRUS PCR: NEGATIVE
C CAYETANENSIS DNA SPEC QL NAA+PROBE: NEGATIVE
CAMPY SP DNA.DIARRHEA STL QL NAA+PROBE: NEGATIVE
CRYPTOSP DNA SPEC QL NAA+PROBE: NEGATIVE
EAEC PAA PLAS AGGR+AATA ST NAA+NON-PRB: NEGATIVE
EC STX1+STX2 + H7 FLIC SPEC NAA+PROBE: NEGATIVE
ENTAMOEBA HISTOLYTICA PCR: NEGATIVE
EPEC EAE GENE STL QL NAA+NON-PROBE: NEGATIVE
ETEC LTA+ST1A+ST1B TOX ST NAA+NON-PROBE: NEGATIVE
GIARDIA LAMBLIA PCR: NEGATIVE
NOROVIRUS GI/GII PCR: POSITIVE
P SHIGELLOIDES DNA STL QL NAA+PROBE: NEGATIVE
ROTAVIRUS A PCR: NEGATIVE
SALMONELLA DNA SPEC QL NAA+PROBE: NEGATIVE
SAPOVIRUS PCR: NEGATIVE
SHIGELLA SP+EIEC IPAH ST NAA+NON-PROBE: NEGATIVE
V CHOLERAE DNA SPEC QL NAA+PROBE: NEGATIVE
VIBRIO DNA SPEC NAA+PROBE: NEGATIVE
YERSINIA DNA SPEC NAA+PROBE: NEGATIVE

## (undated) DEVICE — FORCEP BIOPSY RJ4 LG CAP W/ND

## (undated) DEVICE — 3M™ RED DOT™ MONITORING ELECTRODE WITH FOAM TAPE AND STICKY GEL, 50/BAG, 20/CASE, 72/PLT 2570: Brand: RED DOT™

## (undated) DEVICE — MEDI-VAC SUCTION HANDLE REGULAR CAPACITY: Brand: CARDINAL HEALTH

## (undated) DEVICE — FILTERLINE NASAL ADULT O2/CO2

## (undated) DEVICE — Device: Brand: DEFENDO AIR/WATER/SUCTION AND BIOPSY VALVE

## (undated) DEVICE — ENDOSCOPY PACK UPPER: Brand: MEDLINE INDUSTRIES, INC.

## (undated) DEVICE — 1200CC GUARDIAN II: Brand: GUARDIAN

## (undated) DEVICE — SYRINGE 60ML SLIP TIP

## (undated) DEVICE — MEDI-VAC NON-CONDUCTIVE SUCTION TUBING: Brand: CARDINAL HEALTH

## (undated) NOTE — LETTER
Renea Vazquez 89  [address] [facility] [address], [address] [facility]  Authorization for Surgical Operation and Procedure     Date:___________                                                                                                         Time:__________ 4.   Should the need arise during my operation or immediate post-operative period, I also consent to the administration of blood and/or blood products.   Further, I understand that despite careful testing and screening of blood or blood products by avi 8.   I recognize that in the event my procedure results in extended X-Ray/fluoroscopy time, I may develop a skin reaction. 9.  If I have a Do Not Attempt Resuscitation (DNAR) order in place, that status will be suspended while in the operating room, proc 1. IJamison agree to be cared for by an anesthesiologist, who is specially trained to monitor me and give me medicine to put me to sleep or keep me comfortable during my procedure    I understand that my anesthesiologist is not an employee or agent 5. My doctor has explained to me other choices available to me for my care (alternatives).   6. Pregnant Patients (“epidural”):  I understand that the risks of having an epidural (medicine given into my back to help control pain during labor), include itchi

## (undated) NOTE — LETTER
Date & Time: 11/15/2018, 9:24 PM  Patient: Adan Whitlock  Encounter Provider(s):    Bethany Ceballos MD       To Whom It May Concern:    Gwen Hugo was seen and treated in our department on 11/15/2018.  He should not return to work until November 17, 201